# Patient Record
(demographics unavailable — no encounter records)

---

## 2024-11-07 NOTE — PLAN
[FreeTextEntry1] : follow up with hematology regarding anticoags Follow with Dr Vera  for bariatric surgery

## 2024-11-07 NOTE — HISTORY OF PRESENT ILLNESS
[de-identified] : s/p lap appendectomy for perforated acurte appendicitis  with multiple abscesses and prolong hospital course and rehospitalizations for recurrent infection and for PE. She is doing well but still had some mild lower abdomen aches and pains. Denies fever, chills.

## 2024-11-07 NOTE — PHYSICAL EXAM
[Normal Breath Sounds] : Normal breath sounds [Abdominal Masses] : No abdominal masses [Abdomen Tenderness] : ~T ~M Abdominal tenderness [Tender] : nontender [Enlarged] : not enlarged [de-identified] : wnl [de-identified] : wnl [de-identified] : lower extremity edema